# Patient Record
Sex: FEMALE | Race: ASIAN | Employment: FULL TIME | URBAN - METROPOLITAN AREA
[De-identification: names, ages, dates, MRNs, and addresses within clinical notes are randomized per-mention and may not be internally consistent; named-entity substitution may affect disease eponyms.]

---

## 2017-12-14 ENCOUNTER — HOSPITAL ENCOUNTER (OUTPATIENT)
Dept: VASCULAR SURGERY | Age: 52
Discharge: HOME | End: 2017-12-14
Attending: INTERNAL MEDICINE
Payer: COMMERCIAL

## 2017-12-14 DIAGNOSIS — R42 LIGHTHEADEDNESS: ICD-10-CM

## 2017-12-14 DIAGNOSIS — I83.813 VARICOSE VEINS OF LEG WITH PAIN, BILATERAL: ICD-10-CM

## 2017-12-14 PROCEDURE — 93880 EXTRACRANIAL BILAT STUDY: CPT

## 2017-12-14 PROCEDURE — 93970 EXTREMITY STUDY: CPT

## 2017-12-14 NOTE — PROCEDURES
[facility]  *** FINAL REPORT ***    Name: Jim Angeles  MRN: [id number]    Outpatient  : 1965  HIS Order #: [id number]  [id number] [facility] Visit #: [id number]  Date: 14 Dec 2017    TYPE OF TEST: Peripheral Venous Testing    REASON FOR TEST  Limb Pain    Right Leg:-  Deep venous thrombosis:           No  Superficial venous thrombosis:    No  Deep venous insufficiency:        Not examined  Superficial venous insufficiency: Not examined    Left Leg:-  Deep venous thrombosis:           No  Superficial venous thrombosis:    No  Deep venous insufficiency:        Not examined  Superficial venous insufficiency: Not examined      INTERPRETATION/FINDINGS  Duplex images were obtained using 2-D gray scale, color flow, and  spectral Doppler analysis. Right leg :  1. Deep vein(s) visualized include the common femoral, deep femoral,  proximal femoral, mid femoral, distal femoral, popliteal(above knee),  popliteal(fossa), popliteal(below knee), posterior tibial and peroneal   veins. 2. No evidence of deep venous thrombosis detected in the veins  visualized. 3. No evidence of superficial thrombosis detected in the greater or  small saphenous veins. 4. No evidence of significant venous insufficiency in the deep veins  visualized. 5. No evidence of significant superficial venous insufficiency in the  greater or small saphenous veins. Left leg :  1. Deep vein(s) visualized include the common femoral, deep femoral,  proximal femoral, mid femoral, distal femoral, popliteal(above knee),  popliteal(fossa), popliteal(below knee), posterior tibial and peroneal   veins. 2. No evidence of deep venous thrombosis detected in the veins  visualized. 3. No evidence of superficial thrombosis detected in the greater or  small saphenous vein. 4. No evidence of significant venous insufficiency in the deep veins  visualized.   5. No evidence of significant superficial venous insufficiency in the  greater or small saphenous veins.    ADDITIONAL COMMENTS    I have personally reviewed the data relevant to the interpretation of  this  study. TECHNOLOGIST: Arnulfo Chacon. MIRTA King  Signed: 12/14/2017 03:54 PM    PHYSICIAN: Jonathan Alcaraz.  Tai Cordoba MD  Signed: 12/15/2017 01:01 AM

## 2017-12-14 NOTE — PROCEDURES
[facility]  *** FINAL REPORT ***    Name: Darryl Pack  MRN: [id number]    Outpatient  : 1965  HIS Order #: [id number]  [id number] [facility] Visit #: [id number]  Date: 14 Dec 2017    TYPE OF TEST: Cerebrovascular Duplex    REASON FOR TEST  Vertigo/Dizziness    Right Carotid:-             Proximal               Mid                 Distal  cm/s  Systolic  Diastolic  Systolic  Diastolic  Systolic  Diastolic  CCA:     16.9      22.3       83.6      25.5       56.5      21.5  Bulb:  ECA:     69.2      16.2  ICA:     64.5      29.5       74.1      32.4       70.9      32.6  ICA/CCA:  0.8       1.5    ICA Stenosis: Normal    Right Vertebral:-  Finding: Antegrade  Sys:       54.1  Inge:       21.5    Right Subclavian: Normal    Left Carotid:-            Proximal                Mid                 Distal  cm/s  Systolic  Diastolic  Systolic  Diastolic  Systolic  Diastolic  CCA:     65.4      23.9       86.8      23.9       79.6      27.1  Bulb:  ECA:     80.7      19.7  ICA:     45.5      19.2       50.1      20.0       66.0      30.9  ICA/CCA:  0.7       1.3    ICA Stenosis: Normal    Left Vertebral:-  Finding: Antegrade  Sys:       50.2  Inge:       16.6    Left Subclavian: Normal    INTERPRETATION/FINDINGS  Duplex images were obtained using 2-D gray scale, color flow, and  spectral Doppler analysis. 1. No evidence of significant arterial occlusive disease in the  internal carotid arteries. 2. No significant stenosis in the external carotid arteries  bilaterally. 3. Antegrade flow in both vertebral arteries. 4. Normal flow in both subclavian arteries. ADDITIONAL COMMENTS    I have personally reviewed the data relevant to the interpretation of  this  study. TECHNOLOGIST: Roger King RVT  Signed: 2017 03:38 PM    PHYSICIAN: Wesly Renee.  Roselle Siemens, MD  Signed: 12/15/2017 01:00 AM

## 2018-04-11 ENCOUNTER — HOSPITAL ENCOUNTER (OUTPATIENT)
Dept: MAMMOGRAPHY | Age: 53
End: 2018-04-11
Attending: INTERNAL MEDICINE
Payer: COMMERCIAL

## 2018-04-11 ENCOUNTER — APPOINTMENT (OUTPATIENT)
Dept: ULTRASOUND IMAGING | Age: 53
End: 2018-04-11
Attending: INTERNAL MEDICINE
Payer: COMMERCIAL

## 2018-04-11 ENCOUNTER — HOSPITAL ENCOUNTER (OUTPATIENT)
Dept: MAMMOGRAPHY | Age: 53
Discharge: HOME | End: 2018-04-11
Attending: INTERNAL MEDICINE
Payer: COMMERCIAL

## 2018-04-11 ENCOUNTER — HOSPITAL ENCOUNTER (OUTPATIENT)
Dept: ULTRASOUND IMAGING | Age: 53
End: 2018-04-11
Attending: INTERNAL MEDICINE
Payer: COMMERCIAL

## 2018-04-11 DIAGNOSIS — Z12.31 VISIT FOR SCREENING MAMMOGRAM: ICD-10-CM

## 2018-04-11 PROCEDURE — 77063 BREAST TOMOSYNTHESIS BI: CPT

## 2018-04-18 ENCOUNTER — HOSPITAL ENCOUNTER (OUTPATIENT)
Dept: MAMMOGRAPHY | Age: 53
Discharge: HOME | End: 2018-04-18
Attending: INTERNAL MEDICINE
Payer: COMMERCIAL

## 2018-04-18 ENCOUNTER — HOSPITAL ENCOUNTER (OUTPATIENT)
Dept: ULTRASOUND IMAGING | Age: 53
Discharge: HOME | End: 2018-04-18
Attending: INTERNAL MEDICINE
Payer: COMMERCIAL

## 2018-04-18 DIAGNOSIS — R10.9 STOMACH ACHE: ICD-10-CM

## 2018-04-18 DIAGNOSIS — R92.8 ABNORMAL MAMMOGRAM: ICD-10-CM

## 2018-04-18 PROCEDURE — 77061 BREAST TOMOSYNTHESIS UNI: CPT

## 2018-04-18 PROCEDURE — 76642 ULTRASOUND BREAST LIMITED: CPT

## 2018-04-18 PROCEDURE — 76700 US EXAM ABDOM COMPLETE: CPT

## 2018-04-18 PROCEDURE — 76830 TRANSVAGINAL US NON-OB: CPT

## 2019-05-30 ENCOUNTER — HOSPITAL ENCOUNTER (OUTPATIENT)
Dept: NON INVASIVE DIAGNOSTICS | Age: 54
Discharge: HOME | End: 2019-05-30
Attending: INTERNAL MEDICINE
Payer: COMMERCIAL

## 2019-05-30 ENCOUNTER — HOSPITAL ENCOUNTER (OUTPATIENT)
Dept: NUCLEAR MEDICINE | Age: 54
Discharge: HOME | End: 2019-05-30
Attending: INTERNAL MEDICINE
Payer: COMMERCIAL

## 2019-05-30 VITALS
HEIGHT: 59 IN | SYSTOLIC BLOOD PRESSURE: 106 MMHG | WEIGHT: 105 LBS | DIASTOLIC BLOOD PRESSURE: 68 MMHG | BODY MASS INDEX: 21.17 KG/M2

## 2019-05-30 DIAGNOSIS — R07.9 CHEST PAIN, UNSPECIFIED: ICD-10-CM

## 2019-05-30 LAB
STRESS ANGINA INDEX: 0
STRESS BASELINE HR: 67 BPM
STRESS ESTIMATED WORKLOAD: 17.1 METS
STRESS EXERCISE DUR MIN: NORMAL
STRESS PEAK DIAS BP: 78 MMHG
STRESS PEAK SYS BP: 140 MMHG
STRESS PERCENT HR ACHIEVED: 113 %
STRESS POST PEAK HR: 190 BPM
STRESS RATE PRESSURE PRODUCT: NORMAL BPM*MMHG
STRESS SR DUKE TREADMILL SCORE: 0
STRESS ST DEPRESSION: 0 MM
STRESS ST ELEVATION: 0 MM
STRESS TARGET HR: 168 BPM

## 2019-05-30 PROCEDURE — A9500 TC99M SESTAMIBI: HCPCS

## 2019-05-30 PROCEDURE — 93017 CV STRESS TEST TRACING ONLY: CPT

## 2020-04-14 ENCOUNTER — HOSPITAL ENCOUNTER (OUTPATIENT)
Dept: GENERAL RADIOLOGY | Age: 55
Discharge: HOME | End: 2020-04-14
Payer: COMMERCIAL

## 2020-04-14 DIAGNOSIS — R52 PAIN: ICD-10-CM

## 2020-04-14 PROCEDURE — 71046 X-RAY EXAM CHEST 2 VIEWS: CPT

## 2023-09-06 ENCOUNTER — HOSPITAL ENCOUNTER (OUTPATIENT)
Dept: WOMENS IMAGING | Facility: HOSPITAL | Age: 58
Discharge: HOME | End: 2023-09-09
Payer: COMMERCIAL

## 2023-09-06 DIAGNOSIS — Z12.31 VISIT FOR SCREENING MAMMOGRAM: ICD-10-CM

## 2023-09-06 PROCEDURE — 77063 BREAST TOMOSYNTHESIS BI: CPT
